# Patient Record
Sex: FEMALE | Race: WHITE | URBAN - METROPOLITAN AREA
[De-identification: names, ages, dates, MRNs, and addresses within clinical notes are randomized per-mention and may not be internally consistent; named-entity substitution may affect disease eponyms.]

---

## 2019-03-04 ENCOUNTER — DOCUMENTATION ONLY (OUTPATIENT)
Dept: OTHER | Facility: CLINIC | Age: 79
End: 2019-03-04

## 2019-03-04 NOTE — PROGRESS NOTES
"Pt is self-referred requesting a \"vein evaluation\".      Pt stated her left ankle and foot is swollen and sore.  Denies wounds/sores/claudication symptoms.  No recent imaging.  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      Provided pt number for [facility].  Pt stated she would call to set up an appointment.    Emily Lubin, CATHYN, RN    "

## 2019-03-11 ENCOUNTER — OFFICE VISIT (OUTPATIENT)
Dept: VASCULAR SURGERY | Facility: CLINIC | Age: 79
End: 2019-03-11
Payer: COMMERCIAL

## 2019-03-11 DIAGNOSIS — Z53.9 ERRONEOUS ENCOUNTER--DISREGARD: Primary | ICD-10-CM

## 2019-03-11 PROCEDURE — 99207 ZZC VEINSOLUTIONS FREE SCREENING: CPT | Performed by: SURGERY

## 2019-03-11 NOTE — PROGRESS NOTES
[facility]: Ethel Mishra came in for evaluation.  This delightful active 79-year-old [place] woman had suffered a significant left knee surgery while living in [place] skiing at the age of 16 treated conservatively.  She has had an unstable knee.  Over time she developed varicose veins in her left calf medially but these have not been overly symptomatic.  She does notice some swelling in her left leg particular after being dependent.  2 weeks ago she noticed severe pain in the instep of her left foot with no palpable phlebitic cords or any other etiology.  She has intermittently worn knee-high compression stockings and she did wear these with some improvement and is almost completely gone today.  No prior history of ulceration, phlebitis, DVT, bleeding.  No problems with the right leg.    PMH: Medications: Simvastatin, vitamins, calcium.            Medical: Mild hypertension not requiring medications                          Well-controlled hyperlipidemia on statin            No major surgical procedures.            Smoker.    ROS: Basically negative.  Very active.  Visits [place] at least once or twice yearly to see relatives.  Able to walk daily every morning and also swim.  No chest pain or claudication symptoms.    FMH: Brother who lives in [place] did have vein surgery.  No known clotting issues in the family.      Exam: Very delightful woman.  Height 5 foot 7 inches weight 160 pounds              HEENT= normal.   Glasses.              Chest= clear               Cardiovascular= regular rate                Extremities= right is unremarkable with very minimal edema and no large varicosities.  Left leg with 3-4 mm varicose vein complex is starting in the medial knee extending down to the mid calf.  No obvious large varicosities or swelling in the medial ankle where she had significant pain.  No phlebitic cords.  Very mild ankle edema.  Scattered diffuse bilateral telangiectasias.  Normal sensation  to both feet.  +3 palpable dorsalis pedis and posterior tibial pulses bilaterally.      Impression: I am unsure the exact etiology of the pain and swelling she had on her left medial ankle 2 weeks ago that is now resolved.  Certainly has significant left calf varicose veins and they could be adding to the swelling of her leg but not necessarily explain the acute episode.  She has knee-high compression athletic type stockings that she just recently purchased and will start wearing these in a much more regular basis.  She is flying to [place] in early May indefinitely will wear these whenever she takes the flights but should try this on a more daily basis to see if it helps.  Formal left leg venous duplex ultrasound will be ordered and I will see her following this.       Stas Altamirano MD     2/11/2019